# Patient Record
Sex: FEMALE | Race: WHITE | URBAN - METROPOLITAN AREA
[De-identification: names, ages, dates, MRNs, and addresses within clinical notes are randomized per-mention and may not be internally consistent; named-entity substitution may affect disease eponyms.]

---

## 2018-04-13 ENCOUNTER — OFFICE VISIT (OUTPATIENT)
Dept: OTOLARYNGOLOGY | Facility: CLINIC | Age: 23
End: 2018-04-13

## 2018-04-13 VITALS
BODY MASS INDEX: 21.17 KG/M2 | WEIGHT: 124 LBS | TEMPERATURE: 99 F | HEIGHT: 64 IN | SYSTOLIC BLOOD PRESSURE: 99 MMHG | DIASTOLIC BLOOD PRESSURE: 67 MMHG

## 2018-04-13 DIAGNOSIS — H61.23 BILATERAL IMPACTED CERUMEN: ICD-10-CM

## 2018-04-13 DIAGNOSIS — R09.81 NASAL CONGESTION: Primary | ICD-10-CM

## 2018-04-13 PROCEDURE — 99212 OFFICE O/P EST SF 10 MIN: CPT | Performed by: OTOLARYNGOLOGY

## 2018-04-13 PROCEDURE — 69210 REMOVE IMPACTED EAR WAX UNI: CPT | Performed by: OTOLARYNGOLOGY

## 2018-04-13 PROCEDURE — 99243 OFF/OP CNSLTJ NEW/EST LOW 30: CPT | Performed by: OTOLARYNGOLOGY

## 2018-04-13 RX ORDER — FLUTICASONE PROPIONATE 50 MCG
2 SPRAY, SUSPENSION (ML) NASAL DAILY
Qty: 1 BOTTLE | Refills: 11 | Status: SHIPPED | OUTPATIENT
Start: 2018-04-13

## 2018-04-13 NOTE — PROGRESS NOTES
Trell Espinosa is a 21year old female.   Patient presents with:  Ear Problem: pressure buildup in both ears; ears pop when she eats/drinks; no drainage or pain in ears; pt not taking meds at this time  Sinus Problem: states she can't breathe out of one no bleeding and easy bruising.            PHYSICAL EXAM    BP 99/67 (BP Location: Left arm, Patient Position: Sitting)   Temp 99.1 °F (37.3 °C) (Tympanic)   Ht 5' 4\" (1.626 m)   Wt 124 lb (56.2 kg)   BMI 21.28 kg/m²        Constitutional Normal Overall appear Bilateral impacted cerumen     - REMOVAL IMPACTED CERUMEN REQUIRING INSTRUMENTATION, UNILATERAL            Tee Avila MD

## (undated) NOTE — LETTER
No referring provider defined for this encounter. 04/13/18        Patient: Adry Castorena   YOB: 1995   Date of Visit: 4/13/2018       Dear  Dr. Mayi Vazquez MD,      Thank you for referring Adry Castorena to my practice.   She presented